# Patient Record
Sex: FEMALE | URBAN - METROPOLITAN AREA
[De-identification: names, ages, dates, MRNs, and addresses within clinical notes are randomized per-mention and may not be internally consistent; named-entity substitution may affect disease eponyms.]

---

## 2019-04-30 ENCOUNTER — APPOINTMENT (RX ONLY)
Dept: URBAN - METROPOLITAN AREA CLINIC 12 | Facility: CLINIC | Age: 34
Setting detail: DERMATOLOGY
End: 2019-04-30

## 2019-04-30 VITALS — HEIGHT: 64 IN | WEIGHT: 126 LBS

## 2019-04-30 DIAGNOSIS — Z71.89 OTHER SPECIFIED COUNSELING: ICD-10-CM

## 2019-04-30 DIAGNOSIS — L50.1 IDIOPATHIC URTICARIA: ICD-10-CM

## 2019-04-30 PROCEDURE — ? OTHER

## 2019-04-30 PROCEDURE — ? COUNSELING

## 2019-04-30 PROCEDURE — 99202 OFFICE O/P NEW SF 15 MIN: CPT

## 2019-04-30 NOTE — PROCEDURE: OTHER
Note Text (......Xxx Chief Complaint.): This diagnosis correlates with the
Detail Level: Detailed
Other (Free Text): Patient believes it was detergent the caused the hives. She was counseled on treating symptoms not the cause and that if it persists for more then 6 months then further evaluation will be done. The antihistamines she tried resolved the issue, and she has no hives present at this time.